# Patient Record
Sex: FEMALE | Race: ASIAN | URBAN - METROPOLITAN AREA
[De-identification: names, ages, dates, MRNs, and addresses within clinical notes are randomized per-mention and may not be internally consistent; named-entity substitution may affect disease eponyms.]

---

## 2017-09-16 ENCOUNTER — OFFICE VISIT (OUTPATIENT)
Dept: FAMILY MEDICINE | Facility: CLINIC | Age: 22
End: 2017-09-16

## 2017-09-16 DIAGNOSIS — S06.0X0A CONCUSSION WITHOUT LOSS OF CONSCIOUSNESS, INITIAL ENCOUNTER: Primary | ICD-10-CM

## 2017-09-16 NOTE — MR AVS SNAPSHOT
"              After Visit Summary   2017    Yelitza Dawson    MRN: [id number]           Patient Information     Date Of Birth          1995        Visit Information        Provider Department      2017 1:00 PM Theron Smith MD [facility]        Today's Diagnoses     Concussion without loss of consciousness, initial encounter    -  1       Follow-ups after your visit        Who to contact     Please call your clinic at [phone] to:    Ask questions about your health    Make or cancel appointments    Discuss your medicines    Learn about your test results    Speak to your doctor   If you have compliments or concerns about an experience at your clinic, or if you wish to file a complaint, please contact [facility] Patient Relations at [phone] or email us at [email or web address]         Additional Information About Your Visit        MyChart Information     1Rebelt is an electronic gateway that provides easy, online access to your medical records. With [employer], you can request a clinic appointment, read your test results, renew a prescription or communicate with your care team.     To sign up for 1Rebelt visit the website at [email or web address]   You will be asked to enter the access code listed below, as well as some personal information. Please follow the directions to create your username and password.     Your access code is: [email or web address]  Expires: 2017 10:44 AM     Your access code will  in 90 days. If you need help or a new code, please contact your [facility] or call [phone] for assistance.        Care EveryWhere ID     This is your Care EveryWhere ID. This could be used by other organizations to access your [facility] medical records  [id number]        Your Vitals Were     Height BMI (Body Mass Index)                1.6 m (5' 3\") 20.37 kg/m2           Blood Pressure " from Last 3 Encounters:   09/21/15 104/62    Weight from Last 3 Encounters:   09/17/17 52.2 kg (115 lb)   09/17/17 52.2 kg (115 lb)   09/21/15 47.6 kg (105 lb) (10 %)*     * Growth percentiles are based on [facility] 2-20 Years data.              Today, you had the following     No orders found for display       Primary Care Provider    None Specified       No primary provider on file.        Equal Access to Services     TREY FONTENOT : Hadii ham de la rosao Solorena, wafantasmada gayleadaha, qaybta kaalmada adeursulayada, earnest gordon . So [facility] [phone].    ATENCIÓN: Si habla español, tiene a hope disposición servicios gratuitos de asistencia lingüística. Llame al [phone].    We comply with applicable federal civil rights laws and [place] laws. We do not discriminate on the basis of race, color, national origin, age, disability sex, sexual orientation or gender identity.            Thank you!     Thank you for choosing [facility]  for your care. Our goal is always to provide you with excellent care. Hearing back from our patients is one way we can continue to improve our services. Please take a few minutes to complete the written survey that you may receive in the mail after your visit with us. Thank you!             Your Updated Medication List - Protect others around you: Learn how to safely use, store and throw away your medicines at www.disposemymeds.org.      Notice  As of 9/16/2017 11:59 PM    You have not been prescribed any medications.

## 2017-09-16 NOTE — LETTER
Date:September 19, 2017      Patient was self referred, no letter generated. Do not send.        [facility] Health Information

## 2017-09-16 NOTE — LETTER
"  9/16/2017      RE: Yelitza Dawson  [address] [place] [address]       9/18/2017  SUBJECTIVE:  Yelitza is a 22 year old female who presents for evaluation of a concussion. This occurred two days ago at [facility] when she 'was dropped on her head' she did not inform the , nor did her coaches for the cheer team, until the following day. The coaching staff told her to 'go home and rest and call us if you think things are worse. She had immediate pain in neck and head following the injury. She then was contacted by the ATC the following day when the ATC found out that she had an injury and her teammates were concerned. The coaching staff did not inform anyone of the witnessed incident.      Head injury occurred 2 day(s) ago  Mechanism of injury: dropped on head during a stunt  Immediate symptoms at time of injury: no LOC, headache, light sensitivity, dizziness, neck pain  Treatment to date:  This is the first patient visit for this problem   Level of activity to date is:  No activity initiated.    Prior concussion history:  No  Prior concussion date:  n/a    Current Symptoms:   See SCAT 3 per atc note    Sleep: No Issues    Past pertinent history: Migraines: no     Depression: no     Anxiety: no     Learning disability: no     ADHD: no    No past medical history on file.    There is no problem list on file for this patient.     REVIEW OF SYSTEMS:  CONSTITUTIONAL:NEGATIVE for fever, chills, change in weight  HEENT: negative  EYE- negative  MUSCULOSKELETAL:positive for neck pain    OBJECTIVE:   Ht 1.6 m (5' 3\")  Wt 52.2 kg (115 lb)  BMI 20.37 kg/m2     EXAM:  General Appearance: healthy, alert and no distress              Head- no lacerations visualized. Skull is non-tender to palpation    Face- appears symmetric. All facial bones are non-tender to palpation  Eyes- normal on inspection with out any swelling. Eyelids and conjunctiva appear normal. PERRLA. Extraocular muscles move " normally. No nystagmus is noted.   ENT- External auditory canal and tympanic membranes are normal. Nasal mucosa and oropharynx appears to be normal.   NECK -supple, non-tender to palpation, full range of motion without pain  Psych- mentation appears normal. and affect normal/bright  Strength: Normal strength in bilateral upper and lower extremities  Sensations- normal in all dermatomes  Cranial nerve testing was normal  Cerebellar tests- rapid alternating hand movements and finger to nose coordination tests were normal  Romberg test - normal  Pronator drift - negative    HEENT:    Tympanic Membranes:Pearly  External Ear Canal:Normal  Oropharynx:Atraumatic  Reflexes: Normal  NECK:  supple, tender on right paraspinal muscles, FULL ROM, negative spurlings    Neurologic:  Cranial Nerves 2-12:  intact  OBIE:Yes  EOMI:Yes    Balance Testing: Romberg:normal    Painful Eye movements: No    Strength:  Shoulder shrug (C5):5/5  Deltoid (C5): 5/5  Bicep (C6):5/5  Wrist Extension (C6): 5/5  Tricep (C7):5/5  Wrist Flexion (C7): 5/5  Finger Flexion (C8/T1):5/5      Cognitive Assessment  Can remember months of year in reverse order:  Yes  Can count backwards from 100 by 3's:  Yes    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    Will start [facility] concussion protocol. Discussed with athlete and ATC  F/u in a few days and contact ATC if condition worsens  Dr Luis Smith MD

## 2017-09-17 VITALS — HEIGHT: 63 IN | WEIGHT: 115 LBS | BODY MASS INDEX: 20.38 KG/M2

## 2017-09-18 NOTE — PROGRESS NOTES
"9/18/2017  SUBJECTIVE:  Yelitza is a 22 year old female who presents for evaluation of a concussion. This occurred two days ago at [facility] when she 'was dropped on her head' she did not inform the , nor did her coaches for the cheer team, until the following day. The coaching staff told her to 'go home and rest and call us if you think things are worse. She had immediate pain in neck and head following the injury. She then was contacted by the ATC the following day when the ATC found out that she had an injury and her teammates were concerned. The coaching staff did not inform anyone of the witnessed incident.      Head injury occurred 2 day(s) ago  Mechanism of injury: dropped on head during a stunt  Immediate symptoms at time of injury: no LOC, headache, light sensitivity, dizziness, neck pain  Treatment to date:  This is the first patient visit for this problem   Level of activity to date is:  No activity initiated.    Prior concussion history:  No  Prior concussion date:  n/a    Current Symptoms:   See SCAT 3 per atc note    Sleep: No Issues    Past pertinent history: Migraines: no     Depression: no     Anxiety: no     Learning disability: no     ADHD: no    No past medical history on file.    There is no problem list on file for this patient.     REVIEW OF SYSTEMS:  CONSTITUTIONAL:NEGATIVE for fever, chills, change in weight  HEENT: negative  EYE- negative  MUSCULOSKELETAL:positive for neck pain    OBJECTIVE:   Ht 1.6 m (5' 3\")  Wt 52.2 kg (115 lb)  BMI 20.37 kg/m2     EXAM:  General Appearance: healthy, alert and no distress              Head- no lacerations visualized. Skull is non-tender to palpation    Face- appears symmetric. All facial bones are non-tender to palpation  Eyes- normal on inspection with out any swelling. Eyelids and conjunctiva appear normal. PERRLA. Extraocular muscles move normally. No nystagmus is noted.   ENT- External auditory canal and tympanic membranes are " normal. Nasal mucosa and oropharynx appears to be normal.   NECK -supple, non-tender to palpation, full range of motion without pain  Psych- mentation appears normal. and affect normal/bright  Strength: Normal strength in bilateral upper and lower extremities  Sensations- normal in all dermatomes  Cranial nerve testing was normal  Cerebellar tests- rapid alternating hand movements and finger to nose coordination tests were normal  Romberg test - normal  Pronator drift - negative    HEENT:    Tympanic Membranes:Pearly  External Ear Canal:Normal  Oropharynx:Atraumatic  Reflexes: Normal  NECK:  supple, tender on right paraspinal muscles, FULL ROM, negative spurlings    Neurologic:  Cranial Nerves 2-12:  intact  OBIE:Yes  EOMI:Yes    Balance Testing: Romberg:normal    Painful Eye movements: No    Strength:  Shoulder shrug (C5):5/5  Deltoid (C5): 5/5  Bicep (C6):5/5  Wrist Extension (C6): 5/5  Tricep (C7):5/5  Wrist Flexion (C7): 5/5  Finger Flexion (C8/T1):5/5      Cognitive Assessment  Can remember months of year in reverse order:  Yes  Can count backwards from 100 by 3's:  Yes    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    Will start [facility] concussion protocol. Discussed with athlete and ATC  F/u in a few days and contact ATC if condition worsens  Dr Smith

## 2017-09-30 ENCOUNTER — OFFICE VISIT (OUTPATIENT)
Dept: FAMILY MEDICINE | Facility: CLINIC | Age: 22
End: 2017-09-30

## 2017-09-30 DIAGNOSIS — S06.0X0D CONCUSSION WITHOUT LOSS OF CONSCIOUSNESS, SUBSEQUENT ENCOUNTER: Primary | ICD-10-CM

## 2017-09-30 NOTE — LETTER
Date:October 3, 2017      Patient was self referred, no letter generated. Do not send.        [facility] Health Information

## 2017-09-30 NOTE — MR AVS SNAPSHOT
After Visit Summary   2017    Yelitza Dawson    MRN: [id number]           Patient Information     Date Of Birth          1995        Visit Information        Provider Department      2017 10:00 AM Theron Smith MD [facility]        Today's Diagnoses     Concussion without loss of consciousness, subsequent encounter    -  1       Follow-ups after your visit        Who to contact     Please call your clinic at [phone] to:    Ask questions about your health    Make or cancel appointments    Discuss your medicines    Learn about your test results    Speak to your doctor   If you have compliments or concerns about an experience at your clinic, or if you wish to file a complaint, please contact [facility] Patient Relations at [phone] or email us at [email or web address]         Additional Information About Your Visit        MyChart Information     BECC is an electronic gateway that provides easy, online access to your medical records. With BECC, you can request a clinic appointment, read your test results, renew a prescription or communicate with your care team.     To sign up for PrePayt visit the website at [email or web address]   You will be asked to enter the access code listed below, as well as some personal information. Please follow the directions to create your username and password.     Your access code is: [email or web address]  Expires: 2017 10:44 AM     Your access code will  in 90 days. If you need help or a new code, please contact your [facility] or call [phone] for assistance.        Care EveryWhere ID     This is your Care EveryWhere ID. This could be used by other organizations to access your [facility] medical records  [id number]         Blood Pressure from Last 3 Encounters:   09/21/15 104/62    Weight from Last 3 Encounters:   17 115 lb  (52.2 kg)   09/17/17 115 lb (52.2 kg)   09/21/15 105 lb (47.6 kg) (10 %)*     * Growth percentiles are based on CDC 2-20 Years data.              Today, you had the following     No orders found for display       Primary Care Provider    None Specified       No primary provider on file.        Equal Access to Services     CASSIDYVentura County Medical CenterLINDA : Hadii ham ku hadrajo Sokamarali, waaxda luqadaha, qaybta kaalmada yesi, earnest famjoeamy gordon . So [facility] [phone].    ATENCIÓN: Si habla español, tiene a hope disposición servicios gratuitos de asistencia lingüística. Llame al [phone].    We comply with applicable federal civil rights laws and [place] laws. We do not discriminate on the basis of race, color, national origin, age, disability, sex, sexual orientation, or gender identity.            Thank you!     Thank you for choosing [facility]  for your care. Our goal is always to provide you with excellent care. Hearing back from our patients is one way we can continue to improve our services. Please take a few minutes to complete the written survey that you may receive in the mail after your visit with us. Thank you!             Your Updated Medication List - Protect others around you: Learn how to safely use, store and throw away your medicines at www.disposemymeds.org.      Notice  As of 9/30/2017 11:59 PM    You have not been prescribed any medications.

## 2017-09-30 NOTE — LETTER
9/30/2017      RE: Yelitza Dawson  [address] [place] [address]         SUBJECTIVE:  Yelitza is a 22 year old female who presents for f/u for concussion. She has progressed through [facility] protocol without issue.   No complaints today  No symptoms  See ATC SCAT 3 note      Prior concussion history:  No  Prior concussion date:  n/a    Current Symptoms:   See SCAT 3 per atc note    Sleep: No Issues    Past pertinent history: Migraines: no     Depression: no     Anxiety: no     Learning disability: no     ADHD: no       REVIEW OF SYSTEMS:  CONSTITUTIONAL:NEGATIVE for fever, chills, change in weight  HEENT: negative  EYE- negative  MUSCULOSKELETAL:no neck pain today, no concerns    OBJECTIVE:   Vss, reviewed    EXAM:  General Appearance: healthy, alert and no distress              Head- no lacerations visualized. Skull is non-tender to palpation    Face- appears symmetric. All facial bones are non-tender to palpation  Eyes- normal on inspection with out any swelling. Eyelids and conjunctiva appear normal. PERRLA. Extraocular muscles move normally. No nystagmus is noted.   ENT- External auditory canal and tympanic membranes are normal. Nasal mucosa and oropharynx appears to be normal.   NECK -supple, non-tender to palpation, full range of motion without pain  Psych- mentation appears normal. and affect normal/bright  Strength: Normal strength in bilateral upper and lower extremities  Sensations- normal in all dermatomes  Cranial nerve testing was normal  Cerebellar tests- rapid alternating hand movements and finger to nose coordination tests were normal  Romberg test - normal  Pronator drift - negative    HEENT:    Tympanic Membranes:Pearly  External Ear Canal:Normal  Oropharynx:Atraumatic  Reflexes: Normal  NECK:  supple, NON tender on right paraspinal muscles, FULL ROM, negative spurlings    Neurologic:  Cranial Nerves 2-12:  intact  OBIE:Yes  EOMI:Yes    Balance Testing: Romberg:normal    Painful Eye  movements: No    Strength:  Shoulder shrug (C5):5/5  Deltoid (C5): 5/5  Bicep (C6):5/5  Wrist Extension (C6): 5/5  Tricep (C7):5/5  Wrist Flexion (C7): 5/5  Finger Flexion (C8/T1):5/5      Cognitive Assessment  Can remember months of year in reverse order:  Yes  Can count backwards from 100 by 3's:  Yes    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 Discussed with athlete and ATC  F/u only if condition worsens    Dr Luis Smith MD

## 2017-10-02 NOTE — PROGRESS NOTES
SUBJECTIVE:  Yelitza is a 22 year old female who presents for f/u for concussion. She has progressed through [facility] protocol without issue.   No complaints today  No symptoms  See ATC SCAT 3 note      Prior concussion history:  No  Prior concussion date:  n/a    Current Symptoms:   See SCAT 3 per atc note    Sleep: No Issues    Past pertinent history: Migraines: no     Depression: no     Anxiety: no     Learning disability: no     ADHD: no       REVIEW OF SYSTEMS:  CONSTITUTIONAL:NEGATIVE for fever, chills, change in weight  HEENT: negative  EYE- negative  MUSCULOSKELETAL:no neck pain today, no concerns    OBJECTIVE:   Vss, reviewed    EXAM:  General Appearance: healthy, alert and no distress              Head- no lacerations visualized. Skull is non-tender to palpation    Face- appears symmetric. All facial bones are non-tender to palpation  Eyes- normal on inspection with out any swelling. Eyelids and conjunctiva appear normal. PERRLA. Extraocular muscles move normally. No nystagmus is noted.   ENT- External auditory canal and tympanic membranes are normal. Nasal mucosa and oropharynx appears to be normal.   NECK -supple, non-tender to palpation, full range of motion without pain  Psych- mentation appears normal. and affect normal/bright  Strength: Normal strength in bilateral upper and lower extremities  Sensations- normal in all dermatomes  Cranial nerve testing was normal  Cerebellar tests- rapid alternating hand movements and finger to nose coordination tests were normal  Romberg test - normal  Pronator drift - negative    HEENT:    Tympanic Membranes:Pearly  External Ear Canal:Normal  Oropharynx:Atraumatic  Reflexes: Normal  NECK:  supple, NON tender on right paraspinal muscles, FULL ROM, negative spurlings    Neurologic:  Cranial Nerves 2-12:  intact  OBIE:Yes  EOMI:Yes    Balance Testing: Romberg:normal    Painful Eye movements: No    Strength:  Shoulder shrug (C5):5/5  Deltoid (C5): 5/5  Bicep  (C6):5/5  Wrist Extension (C6): 5/5  Tricep (C7):5/5  Wrist Flexion (C7): 5/5  Finger Flexion (C8/T1):5/5      Cognitive Assessment  Can remember months of year in reverse order:  Yes  Can count backwards from 100 by 3's:  Yes    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 Discussed with athlete and ATC  F/u only if condition worsens    Dr Smith

## 2025-06-11 ENCOUNTER — OFFICE VISIT (OUTPATIENT)
Dept: FAMILY MEDICINE | Facility: CLINIC | Age: 30
End: 2025-06-11
Payer: COMMERCIAL

## 2025-06-11 VITALS
DIASTOLIC BLOOD PRESSURE: 71 MMHG | HEART RATE: 91 BPM | WEIGHT: 115.5 LBS | HEIGHT: 62 IN | BODY MASS INDEX: 21.25 KG/M2 | TEMPERATURE: 98.5 F | RESPIRATION RATE: 18 BRPM | OXYGEN SATURATION: 97 % | SYSTOLIC BLOOD PRESSURE: 106 MMHG

## 2025-06-11 DIAGNOSIS — R59.0 AXILLARY LYMPHADENOPATHY: Primary | ICD-10-CM

## 2025-06-11 PROCEDURE — 99203 OFFICE O/P NEW LOW 30 MIN: CPT | Mod: 25 | Performed by: PHYSICIAN ASSISTANT

## 2025-06-11 PROCEDURE — 90651 9VHPV VACCINE 2/3 DOSE IM: CPT | Performed by: PHYSICIAN ASSISTANT

## 2025-06-11 PROCEDURE — 90471 IMMUNIZATION ADMIN: CPT | Performed by: PHYSICIAN ASSISTANT

## 2025-06-11 NOTE — ASSESSMENT & PLAN NOTE
Presents today for evaluation of a lump that she first noted in the left axilla 3 days ago.  She states that Sunday evening she felt feverish but did not take her temperature.  She also had some body aching.  Those symptoms have resolved.  She has not noticed any breast lumps.  No erythema.  She is not on any hormones.  She is a non-smoker.    Exam today demonstrates a small 1 cm palpable lymph node on the left lateral breast.  No breast masses.  Will proceed with a left breast ultrasound for further evaluation.

## 2025-06-11 NOTE — PROGRESS NOTES
"  Assessment & Plan   Problem List Items Addressed This Visit       Axillary lymphadenopathy - Primary    Presents today for evaluation of a lump that she first noted in the left axilla 3 days ago.  She states that Sunday evening she felt feverish but did not take her temperature.  She also had some body aching.  Those symptoms have resolved.  She has not noticed any breast lumps.  No erythema.    Exam today demonstrates a small 1 cm palpable lymph node on the left lateral breast.  No breast masses.  Will proceed with a left breast ultrasound for further evaluation.         Relevant Orders    US Breast Left Limited 1-3 Quadrants       Subjective   Shahid is a 30 year old, presenting for the following health issues:  Pain (Lump under Left  arm )        6/11/2025    10:40 AM   Additional Questions   Roomed by Trish Bhardwaj         6/11/2025   Forms   Any forms needing to be completed Yes     History of Present Illness       Reason for visit:  Lump below the armpit, same level as my breast, but ok the side of it. It is a sore around the area.   She is taking medications regularly.              Objective    /71 (BP Location: Left arm, Patient Position: Left side, Cuff Size: Adult Small)   Pulse 91   Temp 98.5  F (36.9  C)   Resp 18   Ht 1.562 m (5' 1.5\")   Wt 52.4 kg (115 lb 8 oz)   LMP 05/28/2025   SpO2 97%   BMI 21.47 kg/m    Body mass index is 21.47 kg/m .  Physical Exam  Vitals and nursing note reviewed.   Constitutional:       Appearance: Normal appearance.   Chest:          Comments: Small 1 cm palpable movable lymph node left lateral breast.  No erythema.  No skin changes.  No palpable breast mass.  Neurological:      Mental Status: She is alert.                Signed Electronically by: Iqra Manzano PA-C    "

## 2025-06-17 ENCOUNTER — ANCILLARY PROCEDURE (OUTPATIENT)
Dept: MAMMOGRAPHY | Facility: CLINIC | Age: 30
End: 2025-06-17
Attending: PHYSICIAN ASSISTANT
Payer: COMMERCIAL

## 2025-06-17 ENCOUNTER — RESULTS FOLLOW-UP (OUTPATIENT)
Dept: FAMILY MEDICINE | Facility: CLINIC | Age: 30
End: 2025-06-17

## 2025-06-17 DIAGNOSIS — R59.0 AXILLARY LYMPHADENOPATHY: ICD-10-CM

## 2025-06-17 PROCEDURE — 76642 ULTRASOUND BREAST LIMITED: CPT | Mod: LT

## 2025-06-28 ENCOUNTER — HEALTH MAINTENANCE LETTER (OUTPATIENT)
Age: 30
End: 2025-06-28